# Patient Record
Sex: MALE | Race: WHITE | NOT HISPANIC OR LATINO | Employment: OTHER | URBAN - METROPOLITAN AREA
[De-identification: names, ages, dates, MRNs, and addresses within clinical notes are randomized per-mention and may not be internally consistent; named-entity substitution may affect disease eponyms.]

---

## 2020-01-14 ENCOUNTER — APPOINTMENT (OUTPATIENT)
Dept: RADIOLOGY | Age: 45
End: 2020-01-14
Payer: COMMERCIAL

## 2020-01-14 ENCOUNTER — OFFICE VISIT (OUTPATIENT)
Dept: URGENT CARE | Age: 45
End: 2020-01-14
Payer: COMMERCIAL

## 2020-01-14 VITALS
HEART RATE: 93 BPM | HEIGHT: 66 IN | DIASTOLIC BLOOD PRESSURE: 78 MMHG | TEMPERATURE: 97.5 F | BODY MASS INDEX: 43.87 KG/M2 | WEIGHT: 273 LBS | OXYGEN SATURATION: 99 % | RESPIRATION RATE: 20 BRPM | SYSTOLIC BLOOD PRESSURE: 152 MMHG

## 2020-01-14 DIAGNOSIS — M25.572 ACUTE LEFT ANKLE PAIN: ICD-10-CM

## 2020-01-14 DIAGNOSIS — S82.892A CLOSED FRACTURE OF LEFT ANKLE, INITIAL ENCOUNTER: Primary | ICD-10-CM

## 2020-01-14 DIAGNOSIS — M79.672 LEFT FOOT PAIN: ICD-10-CM

## 2020-01-14 PROCEDURE — 73630 X-RAY EXAM OF FOOT: CPT

## 2020-01-14 PROCEDURE — 73590 X-RAY EXAM OF LOWER LEG: CPT

## 2020-01-14 PROCEDURE — G0382 LEV 3 HOSP TYPE B ED VISIT: HCPCS | Performed by: PHYSICIAN ASSISTANT

## 2020-01-14 PROCEDURE — 29515 APPLICATION SHORT LEG SPLINT: CPT | Performed by: PHYSICIAN ASSISTANT

## 2020-01-14 PROCEDURE — 73610 X-RAY EXAM OF ANKLE: CPT

## 2020-01-14 NOTE — LETTER
January 14, 2020     Patient: Naeem Cordero   YOB: 1975   Date of Visit: 1/14/2020       To Whom It May Concern: It is my medical opinion that Umang Archuleta may return to work on 1/16/19 or seen and cleared/restrictions by Ortho  If you have any questions or concerns, please don't hesitate to call           Sincerely,        Chato Mei PA-C    CC: No Recipients

## 2020-01-15 NOTE — PATIENT INSTRUCTIONS
RICE- rest, ice, compression, elevation  Splint and crutches/nonweightbearing until seen by Orthopedics  Keep in splint until seen by Ortho  Call tomorrow for official radiology results  Call Ortho today and follow-up with them in the next 1-2 days for further evaluation and treatment     Call G[facility] Che to schedule an appointment: [phone]  Or the direct Ortho number at [phone] to schedule an appointment   Go to the ER immediately if any numbness, tingling, weakness, change in intensity or location of pain, calf pain or swelling, other new or concerning symptoms

## 2020-01-15 NOTE — PROGRESS NOTES
[facility]        NAME: Sammy Rico is a 38 y o  male  : 1975    MRN: [id number]  DATE: 2020  TIME: 8:49 PM    Assessment and Plan   Closed fracture of left ankle, initial encounter [P31 852K]  1  Closed fracture of left ankle, initial encounter  Ambulatory referral to Orthopedic Surgery    Crutches   2  Left foot pain  XR foot 3+ vw left    XR ankle 3+ vw left    XR tibia fibula 2 vw left   3  Acute left ankle pain       Xray- appears to be a distal fibula fracture, pending final read  Static , posterior and sugar-tong Splint and crutches- placed by medical staff for comfort, NV intact post-splinting    Patient Instructions     RICE- rest, ice, compression, elevation  Splint and crutches/nonweightbearing until seen by Orthopedics  Keep in splint until seen by Ortho  Call tomorrow for official radiology results  Call Ortho today and follow-up with them in the next 1-2 days for further evaluation and treatment  Call [facility] Che to schedule an appointment: [phone]  Or the direct Ortho number at [phone] to schedule an appointment   Go to the ER immediately if any numbness, tingling, weakness, change in intensity or location of pain, calf pain or swelling, other new or concerning symptoms    Chief Complaint     Chief Complaint   Patient presents with    Ankle Pain     c/o L Ankle injury - fell this afternoon down stairs on his way to work  Braced fall with L foot  Denies any head injury or LOC  Rates pain at 4/10 on pain scale, describes pain as "achy " States rouble with ambulation & weight-bearing  History of Present Illness       43-year-old male presents with left foot and ankle pain after injury that occurred this afternoon  Patient states he was going down the stairs when accidentally missed the last step twisting his ankle and landing on the left foot  Did not fall  Did not hit his head or lose consciousness    States he continue on his day but states the pain was worse with walking  Pain is worse with walking or palpation, resolved at rest   States it is an achy pain  He has not tried anything for it  Denies any previous injuries to that foot or ankle  He states he did notice some swelling and some bruising to the ankle  He denies any shin or calf pain or swelling, numbness, tingling, weakness, radiating pain, chest pain, shortness of breath, lacerations/abrasions, or other complaints  Review of Systems   Review of Systems   Constitutional: Negative for activity change, appetite change, fatigue and fever  Respiratory: Negative for shortness of breath  Cardiovascular: Negative for chest pain  Gastrointestinal: Negative for abdominal pain, nausea and vomiting  Musculoskeletal: Positive for arthralgias and joint swelling  Negative for neck pain and neck stiffness  Skin: Negative for wound  Neurological: Negative for weakness, numbness and headaches  All other systems reviewed and are negative  Current Medications     No current outpatient medications on file  Current Allergies     Allergies as of 01/14/2020    (No Known Allergies)            The following portions of the patient's history were reviewed and updated as appropriate: allergies, current medications, past family history, past medical history, past social history, past surgical history and problem list      Past Medical History:   Diagnosis Date    Eczema        History reviewed  No pertinent surgical history  No family history on file  Medications have been verified  Objective   /78 (BP Location: Left arm, Patient Position: Sitting, Cuff Size: Standard)   Pulse 93   Temp 97 5 °F (36 4 °C) (Temporal)   Resp 20   Ht 5' 6" (1 676 m)   Wt 124 kg (273 lb)   SpO2 99%   BMI 44 06 kg/m²        Physical Exam     Physical Exam   Constitutional: He is oriented to person, place, and time  He appears well-developed and well-nourished   No distress  Neck: Normal range of motion  Neck supple  No tracheal deviation present  Cardiovascular: Normal rate, regular rhythm and normal heart sounds  Pulmonary/Chest: Effort normal and breath sounds normal  He has no wheezes  Musculoskeletal:        Left ankle: He exhibits swelling (Mild diffuse swelling to the left ankle ) and ecchymosis (Small ecchymoses to the medial aspect of the left ankle)  He exhibits normal range of motion (But mild pain with varus/valgus stress), no deformity, no laceration and normal pulse  Tenderness (Mild, diffuse nonspecific tenderness to palpation to medial, anterior and lateral aspects of the left ankle )  Left lower leg: He exhibits tenderness (mild tenderness to the distal fibula region)  He exhibits no swelling and no deformity  Left foot: There is normal range of motion, no swelling, normal capillary refill and no deformity  No proximal tib fib tenderness to palpation  No calf swelling or tenderness to palpation  Able to wiggle toes  Dorsiflexion/plantar flexion intact   Neurological: He is alert and oriented to person, place, and time  He has normal reflexes  No sensory deficit  NV intact with sensation and strong peripheral pulses  5/5 strength of LE bilaterally   Psychiatric: He has a normal mood and affect  His behavior is normal    Nursing note and vitals reviewed      Splint application  Date/Time: 1/14/2020 8:46 PM  Performed by: Brian Francis PA-C  Authorized by: Brian Francis PA-C     Consent:     Consent obtained:  Verbal    Consent given by:  Patient    Risks discussed:  Discoloration, numbness, pain and swelling    Alternatives discussed:  No treatment, delayed treatment, alternative treatment, observation and referral  Pre-procedure details:     Sensation:  Normal  Procedure details:     Laterality:  Left    Location:  Ankle    Ankle:  L ankle    Splint type:  Short leg (Posterior and sugar-tong, static splint)    Supplies:  [employer] padding, skin protective strip, Ortho-Glass and elastic bandage  Post-procedure details:     Pain:  Improved    Sensation:  Normal    Patient tolerance of procedure: Tolerated well, no immediate complications  Comments:      Neurovascularly intact post splinting  Patient felt comfortable with the splint and crutches

## 2020-01-16 ENCOUNTER — HOSPITAL ENCOUNTER (OUTPATIENT)
Dept: RADIOLOGY | Facility: HOSPITAL | Age: 45
Discharge: HOME | End: 2020-01-16
Payer: MEDICARE

## 2020-01-16 VITALS
SYSTOLIC BLOOD PRESSURE: 158 MMHG | DIASTOLIC BLOOD PRESSURE: 68 MMHG | WEIGHT: 273 LBS | HEART RATE: 111 BPM | HEIGHT: 66 IN | BODY MASS INDEX: 43.87 KG/M2

## 2020-01-16 DIAGNOSIS — S82.842A CLOSED BIMALLEOLAR FRACTURE OF LEFT ANKLE, INITIAL ENCOUNTER: ICD-10-CM

## 2020-01-16 DIAGNOSIS — M25.572 PAIN, JOINT, ANKLE AND FOOT, LEFT: Primary | ICD-10-CM

## 2020-01-16 DIAGNOSIS — M25.572 PAIN, JOINT, ANKLE AND FOOT, LEFT: ICD-10-CM

## 2020-01-16 PROCEDURE — 99203 OFFICE O/P NEW LOW 30 MIN: CPT | Performed by: PHYSICIAN ASSISTANT

## 2020-01-16 PROCEDURE — 29405 APPL SHORT LEG CAST: CPT | Performed by: PHYSICIAN ASSISTANT

## 2020-01-16 PROCEDURE — 73610 X-RAY EXAM OF ANKLE: CPT

## 2020-01-16 NOTE — PROGRESS NOTES
Assessment/Plan   Diagnoses and all orders for this visit:    Pain, joint, ankle and foot, left  -     XR ankle 3+ vw left; Future    Bimalleomar ankle fracture    - Short leg cast  - Non-weightbearing  - Elevate as often as possible  - Follow up in 1 week and 2 weeks with xrays        Subjective   Patient ID: Tino Jackman is a 38 y o  male  Vitals:    01/16/20 1101   BP: 158/68   Pulse: (!) 111     36yo male comes in for an evaluation of his left ankle  He was injured when he tripped and fell on his stairs  He went to urgent care where xrays showed a distal fibular fracture  The pain is dull in character, moderate in severity, pain does not radiate and is not associated with numbness  He was treated with a splint  He states that if at all possible, he prefers not to have surgery because he does not have insurance  Goes by Agustín Funk examined the patient today  The following portions of the patient's history were reviewed and updated as appropriate: allergies, current medications, past family history, past medical history, past social history, past surgical history and problem list     Review of Systems  Ortho Exam  Past Medical History:   Diagnosis Date    Eczema      History reviewed  No pertinent surgical history  History reviewed  No pertinent family history  Social History     Occupational History    Not on file   Tobacco Use    Smoking status: Former Smoker    Smokeless tobacco: Never Used    Tobacco comment: Quit 5 yrs  ago   Substance and Sexual Activity    Alcohol use: Not Currently     Comment: socially    Drug use: Not on file    Sexual activity: Not on file       Review of Systems   Constitutional: Negative  HENT: Negative  Eyes: Negative  Respiratory: Negative  Cardiovascular: Negative  Gastrointestinal: Negative  Endocrine: Negative  Genitourinary: Negative  Musculoskeletal: As below      Allergic/Immunologic: Negative      Neurological: Negative  Hematological: Negative  Psychiatric/Behavioral: Negative  Objective   Physical Exam          I have personally reviewed pertinent films in PACS and my interpretation is fibular fracture with medial mortise widening        · Constitutional: Awake, Alert, Oriented  · Eyes: EOMI  · Psych: Mood and affect appropriate  · Heart: regular rate and rhythm  · Lungs: No audible wheezing  · Abdomen: soft  · Lymph: no lymphedema             left ankle:  - Appearance   Swelling and ecchymosis  - Palpation   + Lateral malleolus tenderness, + Medial malleolus tenderness and + Deltoid tenderness  - ROM   not examined due to fracture status  - Motor   limited by pain  - NVI distally    Cast application  Date/Time: 1/16/2020 11:23 AM  Performed by: Manav Patel PA-C  Authorized by: Manav Patel PA-C     Consent:     Consent obtained:  Verbal    Consent given by:  Patient    Risks discussed:  Discoloration, numbness, pain and swelling    Alternatives discussed:  No treatment, delayed treatment and alternative treatment  Pre-procedure details:     Sensation:  Normal  Procedure details:     Laterality:  Left    Location:  AnkleCast type:  Short leg    Supplies:  Cotton padding and fiberglass

## 2020-01-16 NOTE — PATIENT INSTRUCTIONS
Cast Care   WHAT YOU NEED TO KNOW:   Cast care will help the cast dry and harden correctly, and then protect it until it comes off  Your cast may need up to 48 hours to dry and harden completely  Even after your cast hardens, it can be damaged  DISCHARGE INSTRUCTIONS:   Return to the emergency department if:   · Your cast breaks or gets damaged  · You see drainage, or your cast is stained or smells bad  · Your skin turns blue or pale  · Your skin tingles, burns, or is cold or numb  · You have severe pain that is getting worse and does not go away after you take pain medicine  · Your limb swells, or your cast looks or feels tighter than it was before  Contact your healthcare provider if:   · Something falls into your cast and gets stuck  · You have itching, pain, burning, or weakness where you have the cast      · You have a fever  · You have sores, blisters, or breaks on the skin around the edges of the cast     · You have questions or concerns about your condition or care  Follow up with your healthcare provider as directed: You will need to return to have your cast removed and your bones checked  Write down your questions so you remember to ask them during your visits  Care for your cast while it hardens:   · Protect the cast   Do not put weight on the cast  Do not bend, lean on, or hit the cast with anything  Use the palms of your hands when you move the cast  Do not use your fingers  Your fingers may leave marks on the cast as it dries  · Change positions often  Change your position every 2 hours to help the cast dry faster  Prop your cast on something soft, such as a pillow, to prevent a flat area on your cast      · Keep the cast dry  Tie plastic trash bags around your cast to keep it dry while you bathe  You may use a blow dryer on cool or the lowest heat setting to dry your cast if it gets wet  Do not use a high heat setting, because you may burn your skin   Certain casts can get wet  Ask if you have a waterproof cast   Care for your cast after it hardens:   · Check your cast every day  Contact your healthcare provider if you notice any cracks, dents, holes, or flaking on your cast      · Keep your cast clean and dry  Cover your cast with a towel when you eat  You may have a small piece of cast that can be removed to check on incisions under your cast  Make sure the small piece of cast is kept tightly closed  If your cast gets dirty, use a mild detergent and a damp washcloth to wipe off the outside of your cast  Continue to cover your cast with trash bags to keep it dry while you bathe  · Care for the edges of your cast   Cover the cast edges to keep them smooth  Use 4 inch pieces of waterproof tape  Place one end of the tape under the inside edge of your cast and fold it over to the outside surface  Overlap tape strips until the edges are completely covered  Change the tape as directed  Do not pull or repair any of the padding from inside the cast  This could cause blisters and sores on the skin under your cast      · Keep weight off your cast   Do not let anyone push down or lean on your cast  This may cause it to break  · Do not use sharp objects  Do not use a sharp or pointed object to scratch under your cast  This may cause wounds that can get infected, or you may lose the item inside the cast  If your skin itches, blow cool air under the cast  You may also gently scratch your skin outside the cast with a cloth  © 2017 [employer] Information is for End User's use only and may not be sold, redistributed or otherwise used for commercial purposes  All illustrations and images included in CareNotes® are the copyrighted property of [facility] , [employer]  or Anil Jones  The above information is an  only  It is not intended as medical advice for individual conditions or treatments   Talk to your doctor, nurse or pharmacist before following any medical regimen to see if it is safe and effective for you

## 2020-01-20 NOTE — PROGRESS NOTES
Spoke with patient yesterday at roughly 6:30 p m   Regarding toe discoloration after application of lower extremity cast  Patient was advised to present immediately to the emergency department if concerned about swelling and circulation  He demonstrated understanding

## 2020-01-21 NOTE — TELEPHONE ENCOUNTER
Patient has upcoming appointment with Dr Scottie Burgess  1/23/20  He was self pay but he has [employer] which we are non par  The patient thought that this insurance was not valid/eligible  I told him to call to check  He would have to switch his Medicaid program to one that we participate with  [facility] choices is one we participate with

## 2020-01-22 NOTE — TELEPHONE ENCOUNTER
Spoke to patient again  He does have [employer] which we are non par  He gave me a telephone number for [employer] to check for Continuity of Care  [phone]) Dilcia from [employer] stated we are not able to get a Continuity of Care for this patient  The patient was not seen in our ER and actually this insurance was valid last year  Patient is switching medicaid plans  We (ortho) must continue to care for this patient for his ankle

## 2020-01-23 ENCOUNTER — HOSPITAL ENCOUNTER (OUTPATIENT)
Dept: RADIOLOGY | Facility: HOSPITAL | Age: 45
Discharge: HOME | End: 2020-01-23
Attending: ORTHOPAEDIC SURGERY
Payer: MEDICARE

## 2020-01-23 VITALS
DIASTOLIC BLOOD PRESSURE: 73 MMHG | BODY MASS INDEX: 44.06 KG/M2 | SYSTOLIC BLOOD PRESSURE: 183 MMHG | HEIGHT: 66 IN | HEART RATE: 92 BPM

## 2020-01-23 DIAGNOSIS — S82.842D CLOSED BIMALLEOLAR FRACTURE OF LEFT ANKLE WITH ROUTINE HEALING, SUBSEQUENT ENCOUNTER: ICD-10-CM

## 2020-01-23 DIAGNOSIS — M25.572 PAIN, JOINT, ANKLE AND FOOT, LEFT: Primary | ICD-10-CM

## 2020-01-23 DIAGNOSIS — M25.572 PAIN, JOINT, ANKLE AND FOOT, LEFT: ICD-10-CM

## 2020-01-23 PROCEDURE — 99213 OFFICE O/P EST LOW 20 MIN: CPT | Performed by: ORTHOPAEDIC SURGERY

## 2020-01-23 PROCEDURE — 73610 X-RAY EXAM OF ANKLE: CPT

## 2020-01-23 NOTE — PROGRESS NOTES
Assessment:   Diagnosis ICD-10-CM Associated Orders   1  Pain, joint, ankle and foot, left M25 572 XR ankle 3+ vw left   2  Closed bimalleolar fracture of left ankle with routine healing, subsequent encounter S82 575G        Plan:    - Xrays were reviewed today with the patient  - Reminder to continue with his non-weight bearing status with his crutches  - The heel of his cast was reinforced due to cast compromise  - Follow up in 1 week for xrays in cast, then cast off to be redone    - Explained to the patient that a new cast will placed on every 2 weeks to accomodate for muscle atrophy  To do next visit:  Return in about 1 week (around 1/30/2020) for Recheck left ankle with xrays  The above stated was discussed in layman's terms and the patient expressed understanding  All questions were answered to the patient's satisfaction  Scribe Attestation    I,:   Sharmin Harding am acting as a scribe while in the presence of the attending physician :        I,:   Brittanie Sosa MD personally performed the services described in this documentation    as scribed in my presence :              Subjective:   Naeem Cordero is a 38 y o  male who presents today for his Left ankle bimalleolar fracture that was sustained from a trip and fall on his stairs, 1/14/2020  He is treating conservatively in a short leg cast due to not having insurance  He denies numbness, tingling, or swelling into the toes  He presents today with crutches and short leg cast with a non-weight bearing status  He takes IBU occasionally for pain or discomfort  The heel of his cast is coming apart  He has some calf cramping  Review of systems negative unless otherwise specified in HPI    Past Medical History:   Diagnosis Date    Eczema        History reviewed  No pertinent surgical history  History reviewed  No pertinent family history      Social History     Occupational History    Not on file   Tobacco Use    Smoking status: Former Smoker    Smokeless tobacco: Never Used    Tobacco comment: Quit 5 yrs  ago   Substance and Sexual Activity    Alcohol use: Not Currently     Comment: socially    Drug use: Not on file    Sexual activity: Not on file       No current outpatient medications on file  No Known Allergies         Vitals:    01/23/20 1115   BP: (!) 183/73   Pulse: 92       Objective:                    Left Ankle Exam   Swelling: none    Other   Erythema: absent  Sensation: normal (intact to light touch)    Comments:  Good capillary refill   Ecchymosis within the dorsum of the foot along the toes  Toes are warm pink          Diagnostics, reviewed and taken today if performed as documented: The attending physician has personally reviewed the pertinent films in PACS and interpretation is as follows:  Xrays of the left ankle were performed and reviewed: Alignment is good and mortise is anatomically reduced  Procedures, if performed today:    Procedures    None performed      Portions of the record may have been created with voice recognition software  Occasional wrong word or "sound a like" substitutions may have occurred due to the inherent limitations of voice recognition software  Read the chart carefully and recognize, using context, where substitutions have occurred

## 2020-01-30 ENCOUNTER — HOSPITAL ENCOUNTER (OUTPATIENT)
Dept: RADIOLOGY | Facility: HOSPITAL | Age: 45
Discharge: HOME | End: 2020-01-30
Attending: ORTHOPAEDIC SURGERY
Payer: MEDICARE

## 2020-01-30 VITALS
SYSTOLIC BLOOD PRESSURE: 158 MMHG | HEIGHT: 66 IN | WEIGHT: 273 LBS | DIASTOLIC BLOOD PRESSURE: 84 MMHG | HEART RATE: 106 BPM | BODY MASS INDEX: 43.87 KG/M2

## 2020-01-30 DIAGNOSIS — S82.842D CLOSED BIMALLEOLAR FRACTURE OF LEFT ANKLE WITH ROUTINE HEALING, SUBSEQUENT ENCOUNTER: Primary | ICD-10-CM

## 2020-01-30 DIAGNOSIS — S82.842D CLOSED BIMALLEOLAR FRACTURE OF LEFT ANKLE WITH ROUTINE HEALING, SUBSEQUENT ENCOUNTER: ICD-10-CM

## 2020-01-30 PROCEDURE — 99213 OFFICE O/P EST LOW 20 MIN: CPT | Performed by: ORTHOPAEDIC SURGERY

## 2020-01-30 PROCEDURE — 29405 APPL SHORT LEG CAST: CPT | Performed by: ORTHOPAEDIC SURGERY

## 2020-01-30 PROCEDURE — 73610 X-RAY EXAM OF ANKLE: CPT

## 2020-01-30 NOTE — PROGRESS NOTES
38 y o male presents to the office status post left ankle bimalleolar fracture sustained due to a fall on 01/14/2020  He reports doing well overall  He has been compliant with his restrictions and his cast  He has noticed bruising in his toes  He denies any numbness or tingling  He has no new complaints or concerns today in the office  Patient does have baseline eczema, which is increased over the last couple of weeks, mostly affecting his upper body  Review of Systems  Review of systems negative unless otherwise specified in HPI    Past Medical History  Past Medical History:   Diagnosis Date    Eczema        Past Surgical History  No past surgical history on file  Current Medications  No current outpatient medications on file prior to visit  No current facility-administered medications on file prior to visit  Recent Labs (HCT,HGB,PT,INR,ESR,CRP,GLU,HgA1C)  No results found for: HCT, HGB, WBC, PT, INR, ESR, CRP, GLUCOSE, HGBA1C      Physical exam  · General: Awake, Alert, Oriented  · Eyes: Pupils equal, round and reactive to light  · Heart: regular rate and rhythm  · Lungs: No audible wheezing  · Abdomen: soft  · Skin:  Excessive eczema diffusely  Left ankle  · Patient ambulates with the assistance of 2 crutches  · Ecchymosis present  · Skin intact  No blisters or open sores present  · Sensation intact      Imaging  Images were personally reviewed with the patient    X-rays of left ankle 01/30/2020 were reviewed and shows stable appearance of lateral malleolus fracture    Procedure  Cast application  Date/Time: 1/30/2020 9:07 AM  Performed by: Jamie Pepe MD  Authorized by:  Jamie Pepe MD     Consent:     Consent obtained:  Verbal    Consent given by:  Patient  Pre-procedure details:     Sensation:  Normal  Procedure details:     Laterality:  Left    Location:  Ankle    Ankle:  L ankleCast type:  Short leg    Supplies:  Cotton padding and fiberglass  Post-procedure details: Sensation:  Normal    Patient tolerance of procedure:   Tolerated well, no immediate complications      Assessment/Plan:   38 y o male is status post left ankle bimalleolar fracture sustained on 01/14/2020    · New short leg cast was placed  · Continue nonweightbearing to the left lower extremity in cast  · Reviewed with patient he is 6 weeks nonweightbearing prior to any transition activities  · Follow up 2 weeks for cast removal with skin check  · Patient will need to have cast placed back on after exam

## 2020-02-13 ENCOUNTER — HOSPITAL ENCOUNTER (OUTPATIENT)
Dept: RADIOLOGY | Facility: HOSPITAL | Age: 45
Discharge: HOME | End: 2020-02-13
Attending: ORTHOPAEDIC SURGERY
Payer: MEDICARE

## 2020-02-13 ENCOUNTER — OFFICE VISIT (OUTPATIENT)
Dept: OBGYN CLINIC | Facility: HOSPITAL | Age: 45
End: 2020-02-13
Payer: MEDICARE

## 2020-02-13 VITALS
BODY MASS INDEX: 38.76 KG/M2 | HEART RATE: 75 BPM | WEIGHT: 241.2 LBS | DIASTOLIC BLOOD PRESSURE: 75 MMHG | SYSTOLIC BLOOD PRESSURE: 133 MMHG | HEIGHT: 66 IN

## 2020-02-13 DIAGNOSIS — Z09 FRACTURE FOLLOW-UP: Primary | ICD-10-CM

## 2020-02-13 DIAGNOSIS — Z09 FRACTURE FOLLOW-UP: ICD-10-CM

## 2020-02-13 PROCEDURE — 73610 X-RAY EXAM OF ANKLE: CPT

## 2020-02-13 PROCEDURE — 99213 OFFICE O/P EST LOW 20 MIN: CPT | Performed by: ORTHOPAEDIC SURGERY

## 2020-02-13 NOTE — PROGRESS NOTES
38 y o  male presenting to the office for evaluation of left lateral malleolus fracture  Patient has been not compliant with weight-bearing activities, and has been toe-touch weight-bearing in his cast   Patient denies any pain at this time  He states that his eczema has substantially improved, as he started taking prednisone regularly per his dermatologist   He denies any other acute complaints  ROS  Review of Systems - otherwise negative    Past Medical History:   Diagnosis Date    Eczema      History reviewed  No pertinent surgical history  Results Reviewed     None          Physical Exam  Physical Exam   Constitutional: He is oriented to person, place, and time  He appears well-developed and well-nourished  HENT:   Head: Normocephalic and atraumatic  Eyes: Pupils are equal, round, and reactive to light  EOM are normal    Neck: Neck supple  No tracheal deviation present  Cardiovascular: Normal rate and regular rhythm  Pulmonary/Chest: Effort normal and breath sounds normal    Abdominal: There is no guarding  Neurological: He is alert and oriented to person, place, and time  Skin: Skin is warm and dry  Psychiatric: He has a normal mood and affect  His behavior is normal      Left Ankle Exam     Tenderness   The patient is experiencing no tenderness  Swelling: mild    Other   Erythema: absent  Sensation: normal  Pulse: present        Imaging  I personally reviewed these images :  Stable alignment of left lateral malleolus fracture without substantial callus formation noted    Procedures  Cast change provided for skin check    Assessment/Plan  38 y o  male    1  Fracture follow-up  - XR ankle 3+ vw left; Future    Continue with nonweightbearing to left lower extremity in a cast  Follow-up in 2-3 weeks for repeat evaluation with new x-rays  - at this time, consider transition to a Cam walker boot to start range of motion with physical therapy    Will likely need to be nonweightbearing for 8 weeks

## 2020-02-27 ENCOUNTER — OFFICE VISIT (OUTPATIENT)
Dept: OBGYN CLINIC | Facility: HOSPITAL | Age: 45
End: 2020-02-27
Payer: MEDICARE

## 2020-02-27 ENCOUNTER — HOSPITAL ENCOUNTER (OUTPATIENT)
Dept: RADIOLOGY | Facility: HOSPITAL | Age: 45
Discharge: HOME | End: 2020-02-27
Attending: ORTHOPAEDIC SURGERY
Payer: MEDICARE

## 2020-02-27 VITALS
HEIGHT: 66 IN | DIASTOLIC BLOOD PRESSURE: 77 MMHG | SYSTOLIC BLOOD PRESSURE: 130 MMHG | WEIGHT: 241 LBS | HEART RATE: 78 BPM | BODY MASS INDEX: 38.73 KG/M2

## 2020-02-27 DIAGNOSIS — Z09 FRACTURE FOLLOW-UP: Primary | ICD-10-CM

## 2020-02-27 DIAGNOSIS — Z09 FRACTURE FOLLOW-UP: ICD-10-CM

## 2020-02-27 DIAGNOSIS — S82.842D CLOSED BIMALLEOLAR FRACTURE OF LEFT ANKLE WITH ROUTINE HEALING, SUBSEQUENT ENCOUNTER: ICD-10-CM

## 2020-02-27 PROCEDURE — 99213 OFFICE O/P EST LOW 20 MIN: CPT | Performed by: ORTHOPAEDIC SURGERY

## 2020-02-27 PROCEDURE — 73610 X-RAY EXAM OF ANKLE: CPT

## 2020-02-27 NOTE — PATIENT INSTRUCTIONS
Plan:    - Xrays of the left ankle were reviewed with the patient that there is no further displacement  - Recommended to walk in the cam boot while in the home, but other than that use  The came boot and crutches in public    - He can work on ankle motion with foot out of the boot: bring the toes towards the nose and pushing down 'like on a gas pedal '

## 2020-02-27 NOTE — PROGRESS NOTES
Assessment:   Diagnosis ICD-10-CM Associated Orders   1  Fracture follow-up Z09 XR ankle 3+ vw left   2  Closed bimalleolar fracture of left ankle with routine healing, subsequent encounter S82 [id number] Cam Boot       Plan:    - Xrays of the left ankle were reviewed with the patient that there is no further displacement  - Recommended to walk in the cam boot while in the home, but other than that use  The came boot and crutches in public    - He can work on ankle motion with foot out of the boot: bring the toes towards the nose and pushing down 'like on a gas pedal '    To do next visit:  Return in about 4 weeks (around 3/26/2020) for Recheck left ankle with xrays  The above stated was discussed in layman's terms and the patient expressed understanding  All questions were answered to the patient's satisfaction  Scribe Attestation    I,:   Madi Garcia am acting as a scribe while in the presence of the attending physician :        I,:   Enedina Alvarez MD personally performed the services described in this documentation    as scribed in my presence :              Subjective:   Vivian Mulligan is a 38 y o  male who presents today for a 2 week follow up for his left lateral malleolus fracture  He has been treating in a short leg cast  Patient has been not compliant with weight-bearing activities, and has been toe-touch weight-bearing in his cast    He was injured when he tripped and fell on his stairs, 1/14/2020  Review of systems negative unless otherwise specified in HPI    Past Medical History:   Diagnosis Date    Eczema        History reviewed  No pertinent surgical history  History reviewed  No pertinent family history  Social History     Occupational History    Not on file   Tobacco Use    Smoking status: Former Smoker    Smokeless tobacco: Never Used    Tobacco comment: Quit 5 yrs   ago   Substance and Sexual Activity    Alcohol use: Not Currently     Comment: socially    Drug use: Not on file    Sexual activity: Not on file       No current outpatient medications on file  No Known Allergies         Vitals:    02/27/20 0951   BP: 130/77   Pulse: 78       Objective:                    Left Ankle Exam     Tenderness   The patient is experiencing no tenderness  Swelling: none    Range of Motion   Dorsiflexion: 0   Plantar flexion: 40     Other   Erythema: absent  Sensation: normal  Pulse: present    Comments:  Calf is soft and non-tender          Diagnostics, reviewed and taken today if performed as documented: The attending physician has personally reviewed the pertinent films in PACS and interpretation is as follows:    Xrays of the left ankle performed today and reviewed:  No further displacement of the fracture, callus formation at the fracture site  Procedures, if performed today:    Procedures    None performed      Portions of the record may have been created with voice recognition software  Occasional wrong word or "sound a like" substitutions may have occurred due to the inherent limitations of voice recognition software  Read the chart carefully and recognize, using context, where substitutions have occurred